# Patient Record
Sex: MALE | Race: WHITE | NOT HISPANIC OR LATINO | URBAN - METROPOLITAN AREA
[De-identification: names, ages, dates, MRNs, and addresses within clinical notes are randomized per-mention and may not be internally consistent; named-entity substitution may affect disease eponyms.]

---

## 2020-12-31 ENCOUNTER — INPATIENT (INPATIENT)
Facility: HOSPITAL | Age: 65
LOS: 2 days | Discharge: HOME | DRG: 378 | End: 2021-01-03
Attending: INTERNAL MEDICINE | Admitting: INTERNAL MEDICINE
Payer: COMMERCIAL

## 2020-12-31 VITALS
DIASTOLIC BLOOD PRESSURE: 84 MMHG | HEIGHT: 69 IN | WEIGHT: 216.05 LBS | OXYGEN SATURATION: 97 % | TEMPERATURE: 98 F | HEART RATE: 89 BPM | RESPIRATION RATE: 20 BRPM | SYSTOLIC BLOOD PRESSURE: 134 MMHG

## 2020-12-31 DIAGNOSIS — K92.1 MELENA: ICD-10-CM

## 2020-12-31 DIAGNOSIS — D64.9 ANEMIA, UNSPECIFIED: ICD-10-CM

## 2020-12-31 DIAGNOSIS — R91.1 SOLITARY PULMONARY NODULE: ICD-10-CM

## 2020-12-31 DIAGNOSIS — Z71.89 OTHER SPECIFIED COUNSELING: ICD-10-CM

## 2020-12-31 DIAGNOSIS — Z29.9 ENCOUNTER FOR PROPHYLACTIC MEASURES, UNSPECIFIED: ICD-10-CM

## 2020-12-31 DIAGNOSIS — K57.92 DIVERTICULITIS OF INTESTINE, PART UNSPECIFIED, WITHOUT PERFORATION OR ABSCESS WITHOUT BLEEDING: ICD-10-CM

## 2020-12-31 DIAGNOSIS — K62.5 HEMORRHAGE OF ANUS AND RECTUM: ICD-10-CM

## 2020-12-31 DIAGNOSIS — K76.89 OTHER SPECIFIED DISEASES OF LIVER: ICD-10-CM

## 2020-12-31 LAB
ALBUMIN SERPL ELPH-MCNC: 2.9 G/DL — LOW (ref 3.5–5)
ALBUMIN SERPL ELPH-MCNC: 3.3 G/DL — LOW (ref 3.5–5)
ALP SERPL-CCNC: 40 U/L — SIGNIFICANT CHANGE UP (ref 40–120)
ALP SERPL-CCNC: 45 U/L — SIGNIFICANT CHANGE UP (ref 40–120)
ALT FLD-CCNC: 21 U/L DA — SIGNIFICANT CHANGE UP (ref 10–60)
ALT FLD-CCNC: 25 U/L DA — SIGNIFICANT CHANGE UP (ref 10–60)
ANION GAP SERPL CALC-SCNC: 12 MMOL/L — SIGNIFICANT CHANGE UP (ref 5–17)
ANION GAP SERPL CALC-SCNC: 7 MMOL/L — SIGNIFICANT CHANGE UP (ref 5–17)
APTT BLD: 32.1 SEC — SIGNIFICANT CHANGE UP (ref 27.5–35.5)
AST SERPL-CCNC: 10 U/L — SIGNIFICANT CHANGE UP (ref 10–40)
AST SERPL-CCNC: 18 U/L — SIGNIFICANT CHANGE UP (ref 10–40)
BASOPHILS # BLD AUTO: 0.03 K/UL — SIGNIFICANT CHANGE UP (ref 0–0.2)
BASOPHILS # BLD AUTO: 0.03 K/UL — SIGNIFICANT CHANGE UP (ref 0–0.2)
BASOPHILS NFR BLD AUTO: 0.3 % — SIGNIFICANT CHANGE UP (ref 0–2)
BASOPHILS NFR BLD AUTO: 0.4 % — SIGNIFICANT CHANGE UP (ref 0–2)
BILIRUB SERPL-MCNC: 0.4 MG/DL — SIGNIFICANT CHANGE UP (ref 0.2–1.2)
BILIRUB SERPL-MCNC: 0.6 MG/DL — SIGNIFICANT CHANGE UP (ref 0.2–1.2)
BUN SERPL-MCNC: 15 MG/DL — SIGNIFICANT CHANGE UP (ref 7–18)
BUN SERPL-MCNC: 15 MG/DL — SIGNIFICANT CHANGE UP (ref 7–18)
CALCIUM SERPL-MCNC: 7.9 MG/DL — LOW (ref 8.4–10.5)
CALCIUM SERPL-MCNC: 8.6 MG/DL — SIGNIFICANT CHANGE UP (ref 8.4–10.5)
CHLORIDE SERPL-SCNC: 109 MMOL/L — HIGH (ref 96–108)
CHLORIDE SERPL-SCNC: 111 MMOL/L — HIGH (ref 96–108)
CHOLEST SERPL-MCNC: 120 MG/DL — SIGNIFICANT CHANGE UP
CO2 SERPL-SCNC: 20 MMOL/L — LOW (ref 22–31)
CO2 SERPL-SCNC: 24 MMOL/L — SIGNIFICANT CHANGE UP (ref 22–31)
CREAT SERPL-MCNC: 0.96 MG/DL — SIGNIFICANT CHANGE UP (ref 0.5–1.3)
CREAT SERPL-MCNC: 1.11 MG/DL — SIGNIFICANT CHANGE UP (ref 0.5–1.3)
EOSINOPHIL # BLD AUTO: 0.03 K/UL — SIGNIFICANT CHANGE UP (ref 0–0.5)
EOSINOPHIL # BLD AUTO: 0.06 K/UL — SIGNIFICANT CHANGE UP (ref 0–0.5)
EOSINOPHIL NFR BLD AUTO: 0.4 % — SIGNIFICANT CHANGE UP (ref 0–6)
EOSINOPHIL NFR BLD AUTO: 0.6 % — SIGNIFICANT CHANGE UP (ref 0–6)
ERYTHROCYTE [SEDIMENTATION RATE] IN BLOOD: 10 MM/HR — SIGNIFICANT CHANGE UP (ref 0–20)
GLUCOSE SERPL-MCNC: 116 MG/DL — HIGH (ref 70–99)
GLUCOSE SERPL-MCNC: 123 MG/DL — HIGH (ref 70–99)
HCT VFR BLD CALC: 28 % — LOW (ref 39–50)
HCT VFR BLD CALC: 31.2 % — LOW (ref 39–50)
HCT VFR BLD CALC: 31.8 % — LOW (ref 39–50)
HCT VFR BLD CALC: 36 % — LOW (ref 39–50)
HDLC SERPL-MCNC: 47 MG/DL — SIGNIFICANT CHANGE UP
HGB BLD-MCNC: 10.5 G/DL — LOW (ref 13–17)
HGB BLD-MCNC: 10.6 G/DL — LOW (ref 13–17)
HGB BLD-MCNC: 12.1 G/DL — LOW (ref 13–17)
HGB BLD-MCNC: 9.5 G/DL — LOW (ref 13–17)
IMM GRANULOCYTES NFR BLD AUTO: 0.5 % — SIGNIFICANT CHANGE UP (ref 0–1.5)
IMM GRANULOCYTES NFR BLD AUTO: 0.8 % — SIGNIFICANT CHANGE UP (ref 0–1.5)
INR BLD: 1.14 RATIO — SIGNIFICANT CHANGE UP (ref 0.88–1.16)
IRON SATN MFR SERPL: 28 % — SIGNIFICANT CHANGE UP (ref 20–55)
IRON SATN MFR SERPL: 77 UG/DL — SIGNIFICANT CHANGE UP (ref 65–170)
LDH SERPL L TO P-CCNC: 117 U/L — LOW (ref 120–225)
LIPID PNL WITH DIRECT LDL SERPL: 61 MG/DL — SIGNIFICANT CHANGE UP
LYMPHOCYTES # BLD AUTO: 1.18 K/UL — SIGNIFICANT CHANGE UP (ref 1–3.3)
LYMPHOCYTES # BLD AUTO: 1.94 K/UL — SIGNIFICANT CHANGE UP (ref 1–3.3)
LYMPHOCYTES # BLD AUTO: 13.9 % — SIGNIFICANT CHANGE UP (ref 13–44)
LYMPHOCYTES # BLD AUTO: 19.7 % — SIGNIFICANT CHANGE UP (ref 13–44)
MAGNESIUM SERPL-MCNC: 1.9 MG/DL — SIGNIFICANT CHANGE UP (ref 1.6–2.6)
MCHC RBC-ENTMCNC: 30.6 PG — SIGNIFICANT CHANGE UP (ref 27–34)
MCHC RBC-ENTMCNC: 30.7 PG — SIGNIFICANT CHANGE UP (ref 27–34)
MCHC RBC-ENTMCNC: 31 PG — SIGNIFICANT CHANGE UP (ref 27–34)
MCHC RBC-ENTMCNC: 31.5 PG — SIGNIFICANT CHANGE UP (ref 27–34)
MCHC RBC-ENTMCNC: 33 GM/DL — SIGNIFICANT CHANGE UP (ref 32–36)
MCHC RBC-ENTMCNC: 33.6 GM/DL — SIGNIFICANT CHANGE UP (ref 32–36)
MCHC RBC-ENTMCNC: 33.9 GM/DL — SIGNIFICANT CHANGE UP (ref 32–36)
MCHC RBC-ENTMCNC: 34 GM/DL — SIGNIFICANT CHANGE UP (ref 32–36)
MCV RBC AUTO: 91.1 FL — SIGNIFICANT CHANGE UP (ref 80–100)
MCV RBC AUTO: 91.5 FL — SIGNIFICANT CHANGE UP (ref 80–100)
MCV RBC AUTO: 92.6 FL — SIGNIFICANT CHANGE UP (ref 80–100)
MCV RBC AUTO: 93 FL — SIGNIFICANT CHANGE UP (ref 80–100)
MONOCYTES # BLD AUTO: 0.9 K/UL — SIGNIFICANT CHANGE UP (ref 0–0.9)
MONOCYTES # BLD AUTO: 1.18 K/UL — HIGH (ref 0–0.9)
MONOCYTES NFR BLD AUTO: 10.6 % — SIGNIFICANT CHANGE UP (ref 2–14)
MONOCYTES NFR BLD AUTO: 12 % — SIGNIFICANT CHANGE UP (ref 2–14)
NEUTROPHILS # BLD AUTO: 6.29 K/UL — SIGNIFICANT CHANGE UP (ref 1.8–7.4)
NEUTROPHILS # BLD AUTO: 6.59 K/UL — SIGNIFICANT CHANGE UP (ref 1.8–7.4)
NEUTROPHILS NFR BLD AUTO: 66.9 % — SIGNIFICANT CHANGE UP (ref 43–77)
NEUTROPHILS NFR BLD AUTO: 73.9 % — SIGNIFICANT CHANGE UP (ref 43–77)
NON HDL CHOLESTEROL: 73 MG/DL — SIGNIFICANT CHANGE UP
NRBC # BLD: 0 /100 WBCS — SIGNIFICANT CHANGE UP (ref 0–0)
OB PNL STL: POSITIVE
PHOSPHATE SERPL-MCNC: 2.9 MG/DL — SIGNIFICANT CHANGE UP (ref 2.5–4.5)
PLATELET # BLD AUTO: 186 K/UL — SIGNIFICANT CHANGE UP (ref 150–400)
PLATELET # BLD AUTO: 198 K/UL — SIGNIFICANT CHANGE UP (ref 150–400)
PLATELET # BLD AUTO: 205 K/UL — SIGNIFICANT CHANGE UP (ref 150–400)
PLATELET # BLD AUTO: 220 K/UL — SIGNIFICANT CHANGE UP (ref 150–400)
POTASSIUM SERPL-MCNC: 4.1 MMOL/L — SIGNIFICANT CHANGE UP (ref 3.5–5.3)
POTASSIUM SERPL-MCNC: 4.2 MMOL/L — SIGNIFICANT CHANGE UP (ref 3.5–5.3)
POTASSIUM SERPL-SCNC: 4.1 MMOL/L — SIGNIFICANT CHANGE UP (ref 3.5–5.3)
POTASSIUM SERPL-SCNC: 4.2 MMOL/L — SIGNIFICANT CHANGE UP (ref 3.5–5.3)
PROT SERPL-MCNC: 6.1 G/DL — SIGNIFICANT CHANGE UP (ref 6–8.3)
PROT SERPL-MCNC: 6.7 G/DL — SIGNIFICANT CHANGE UP (ref 6–8.3)
PROTHROM AB SERPL-ACNC: 13.5 SEC — SIGNIFICANT CHANGE UP (ref 10.6–13.6)
RBC # BLD: 3.06 M/UL — LOW (ref 4.2–5.8)
RBC # BLD: 3.37 M/UL — LOW (ref 4.2–5.8)
RBC # BLD: 3.37 M/UL — LOW (ref 4.2–5.8)
RBC # BLD: 3.42 M/UL — LOW (ref 4.2–5.8)
RBC # BLD: 3.95 M/UL — LOW (ref 4.2–5.8)
RBC # FLD: 12.7 % — SIGNIFICANT CHANGE UP (ref 10.3–14.5)
RBC # FLD: 12.8 % — SIGNIFICANT CHANGE UP (ref 10.3–14.5)
RBC # FLD: 12.9 % — SIGNIFICANT CHANGE UP (ref 10.3–14.5)
RBC # FLD: 12.9 % — SIGNIFICANT CHANGE UP (ref 10.3–14.5)
RETICS #: 68.1 K/UL — SIGNIFICANT CHANGE UP (ref 25–125)
RETICS/RBC NFR: 2 % — SIGNIFICANT CHANGE UP (ref 0.5–2.5)
SARS-COV-2 RNA SPEC QL NAA+PROBE: SIGNIFICANT CHANGE UP
SODIUM SERPL-SCNC: 140 MMOL/L — SIGNIFICANT CHANGE UP (ref 135–145)
SODIUM SERPL-SCNC: 143 MMOL/L — SIGNIFICANT CHANGE UP (ref 135–145)
TIBC SERPL-MCNC: 270 UG/DL — SIGNIFICANT CHANGE UP (ref 250–450)
TRIGL SERPL-MCNC: 59 MG/DL — SIGNIFICANT CHANGE UP
TSH SERPL-MCNC: 1.37 UU/ML — SIGNIFICANT CHANGE UP (ref 0.34–4.82)
UIBC SERPL-MCNC: 193 UG/DL — SIGNIFICANT CHANGE UP (ref 110–370)
WBC # BLD: 8.24 K/UL — SIGNIFICANT CHANGE UP (ref 3.8–10.5)
WBC # BLD: 8.5 K/UL — SIGNIFICANT CHANGE UP (ref 3.8–10.5)
WBC # BLD: 8.74 K/UL — SIGNIFICANT CHANGE UP (ref 3.8–10.5)
WBC # BLD: 9.85 K/UL — SIGNIFICANT CHANGE UP (ref 3.8–10.5)
WBC # FLD AUTO: 8.24 K/UL — SIGNIFICANT CHANGE UP (ref 3.8–10.5)
WBC # FLD AUTO: 8.5 K/UL — SIGNIFICANT CHANGE UP (ref 3.8–10.5)
WBC # FLD AUTO: 8.74 K/UL — SIGNIFICANT CHANGE UP (ref 3.8–10.5)
WBC # FLD AUTO: 9.85 K/UL — SIGNIFICANT CHANGE UP (ref 3.8–10.5)

## 2020-12-31 PROCEDURE — 99223 1ST HOSP IP/OBS HIGH 75: CPT | Mod: GC

## 2020-12-31 PROCEDURE — 93010 ELECTROCARDIOGRAM REPORT: CPT

## 2020-12-31 PROCEDURE — 99284 EMERGENCY DEPT VISIT MOD MDM: CPT

## 2020-12-31 PROCEDURE — 74177 CT ABD & PELVIS W/CONTRAST: CPT | Mod: 26

## 2020-12-31 RX ORDER — METRONIDAZOLE 500 MG
500 TABLET ORAL ONCE
Refills: 0 | Status: COMPLETED | OUTPATIENT
Start: 2020-12-31 | End: 2020-12-31

## 2020-12-31 RX ORDER — PANTOPRAZOLE SODIUM 20 MG/1
40 TABLET, DELAYED RELEASE ORAL
Refills: 0 | Status: DISCONTINUED | OUTPATIENT
Start: 2020-12-31 | End: 2021-01-03

## 2020-12-31 RX ORDER — SODIUM CHLORIDE 9 MG/ML
1000 INJECTION INTRAMUSCULAR; INTRAVENOUS; SUBCUTANEOUS
Refills: 0 | Status: DISCONTINUED | OUTPATIENT
Start: 2020-12-31 | End: 2021-01-03

## 2020-12-31 RX ORDER — CIPROFLOXACIN LACTATE 400MG/40ML
400 VIAL (ML) INTRAVENOUS ONCE
Refills: 0 | Status: COMPLETED | OUTPATIENT
Start: 2020-12-31 | End: 2020-12-31

## 2020-12-31 RX ORDER — SODIUM CHLORIDE 9 MG/ML
1000 INJECTION INTRAMUSCULAR; INTRAVENOUS; SUBCUTANEOUS ONCE
Refills: 0 | Status: COMPLETED | OUTPATIENT
Start: 2020-12-31 | End: 2020-12-31

## 2020-12-31 RX ORDER — HYDROCORTISONE 1 %
1 OINTMENT (GRAM) TOPICAL
Refills: 0 | Status: DISCONTINUED | OUTPATIENT
Start: 2020-12-31 | End: 2021-01-03

## 2020-12-31 RX ORDER — METRONIDAZOLE 500 MG
500 TABLET ORAL EVERY 8 HOURS
Refills: 0 | Status: DISCONTINUED | OUTPATIENT
Start: 2020-12-31 | End: 2021-01-03

## 2020-12-31 RX ORDER — CIPROFLOXACIN LACTATE 400MG/40ML
400 VIAL (ML) INTRAVENOUS EVERY 12 HOURS
Refills: 0 | Status: DISCONTINUED | OUTPATIENT
Start: 2020-12-31 | End: 2021-01-03

## 2020-12-31 RX ADMIN — Medication 200 MILLIGRAM(S): at 12:11

## 2020-12-31 RX ADMIN — SODIUM CHLORIDE 1000 MILLILITER(S): 9 INJECTION INTRAMUSCULAR; INTRAVENOUS; SUBCUTANEOUS at 08:09

## 2020-12-31 RX ADMIN — Medication 100 MILLIGRAM(S): at 21:09

## 2020-12-31 RX ADMIN — SODIUM CHLORIDE 70 MILLILITER(S): 9 INJECTION INTRAMUSCULAR; INTRAVENOUS; SUBCUTANEOUS at 16:46

## 2020-12-31 RX ADMIN — Medication 100 MILLIGRAM(S): at 11:52

## 2020-12-31 RX ADMIN — Medication 1 SUPPOSITORY(S): at 18:51

## 2020-12-31 RX ADMIN — SODIUM CHLORIDE 70 MILLILITER(S): 9 INJECTION INTRAMUSCULAR; INTRAVENOUS; SUBCUTANEOUS at 21:10

## 2020-12-31 RX ADMIN — PANTOPRAZOLE SODIUM 40 MILLIGRAM(S): 20 TABLET, DELAYED RELEASE ORAL at 17:07

## 2020-12-31 NOTE — H&P ADULT - PROBLEM SELECTOR PLAN 4
Pt has incidental finding on ct abd as below  Small focal area of tree-in-bud opacities in the RIGHT lower lobe.  Will ast pt to Consider follow-up in 6 weeks.

## 2020-12-31 NOTE — H&P ADULT - NSHPSOCIALHISTORY_GEN_ALL_CORE
Pt used to smoke 4-5 cigarets every day since teenage and quit 28 yr ago but 4 yr ago startd vaping , Pt vaps every hour nicotine product  Pt reports of drinking 1-2 drinks a week and last drink was on 12/25  pt denies any drug use.

## 2020-12-31 NOTE — CONSULT NOTE ADULT - PROBLEM SELECTOR PROBLEM 3
Sore Throat in Children: Care Instructions  Your Care Instructions  Infection by bacteria or a virus causes most sore throats. Cigarette smoke, dry air, air pollution, allergies, or yelling also can cause a sore throat. Sore throats can be painful and annoying. Fortunately, most sore throats go away on their own. Home treatment may help your child feel better sooner. Antibiotics are not needed unless your child has a strep infection. Follow-up care is a key part of your child's treatment and safety. Be sure to make and go to all appointments, and call your doctor if your child is having problems. It's also a good idea to know your child's test results and keep a list of the medicines your child takes. How can you care for your child at home? · If the doctor prescribed antibiotics for your child, give them as directed. Do not stop using them just because your child feels better. Your child needs to take the full course of antibiotics. · If your child is old enough to do so, have him or her gargle with warm salt water at least once each hour to help reduce swelling and relieve discomfort. Use 1 teaspoon of salt mixed in 8 ounces of warm water. Most children can gargle when they are 10to 6years old. · Give acetaminophen (Tylenol) or ibuprofen (Advil, Motrin) for pain. Read and follow all instructions on the label. Do not give aspirin to anyone younger than 20. It has been linked to Reye syndrome, a serious illness. · Try an over-the-counter anesthetic throat spray or throat lozenges, which may help relieve throat pain. Do not give lozenges to children younger than age 3. If your child is younger than age 3, ask your doctor if you can give your child numbing medicines. · Have your child drink plenty of fluids, enough so that his or her urine is light yellow or clear like water. Drinks such as warm water or warm lemonade may ease throat pain.  Frozen ice treats, ice cream, scrambled eggs, gelatin dessert, and sherbet can also soothe the throat. If your child has kidney, heart, or liver disease and has to limit fluids, talk with your doctor before you increase the amount of fluids your child drinks. · Keep your child away from smoke. Do not smoke or let anyone else smoke around your child or in your house. Smoke irritates the throat. · Place a humidifier by your child's bed or close to your child. This may make it easier for your child to breathe. Follow the directions for cleaning the machine. When should you call for help? Call 911 anytime you think your child may need emergency care. For example, call if:  ? · Your child is confused, does not know where he or she is, or is extremely sleepy or hard to wake up. ?Call your doctor now or seek immediate medical care if:  ? · Your child has a new or higher fever. ? · Your child has a fever with a stiff neck or a severe headache. ? · Your child has any trouble breathing. ? · Your child cannot swallow or cannot drink enough because of throat pain. ? · Your child coughs up discolored or bloody mucus. ? Watch closely for changes in your child's health, and be sure to contact your doctor if:  ? · Your child has any new symptoms, such as a rash, an earache, vomiting, or nausea. ? · Your child is not getting better as expected. Where can you learn more? Go to http://heather-denny.info/. Enter B491 in the search box to learn more about \"Sore Throat in Children: Care Instructions. \"  Current as of: May 12, 2017  Content Version: 11.4  © 6546-1399 [employer]. Care instructions adapted under license by [employer] (which disclaims liability or warranty for this information). If you have questions about a medical condition or this instruction, always ask your healthcare professional. [address] any warranty or liability for your use of this information. ACP (advance care planning)

## 2020-12-31 NOTE — H&P ADULT - PROBLEM SELECTOR PLAN 5
Pt has incidental finding if liver cyst as below on ct abd    Small subcentimeter cysts in the periphery of the LEFT lobe of the liver. Additional smaller cysts are seen  will recommend f/u as out pt

## 2020-12-31 NOTE — CONSULT NOTE ADULT - PROBLEM SELECTOR RECOMMENDATION 9
Short round of anbx (7-10 days)  Can advance to clears and then low fiber while on anbx   Avoid NSAIDs   Stool softener  Repeat colonoscopy as an outpatient on an elective basis unless persistent bleeding.

## 2020-12-31 NOTE — H&P ADULT - NSHPPHYSICALEXAM_GEN_ALL_CORE
PHYSICAL EXAM:  GENERAL: speaks in full sentences, no signs of distress  HEAD:  Atraumatic, Normocephalic  EYES: EOMI, PERRLA, conjunctiva and sclera clear  NECK: Supple, No JVD  CHEST/LUNG: Clear to auscultation bilaterally; No wheeze; No crackles; No accessory muscles used  HEART: s1,s2; No murmurs;   ABDOMEN: Soft, Nontender, +distended; some pressure like feeling by pt in LLQ, Bowel sounds present; No guarding  EXTREMITIES:  2+ Peripheral Pulses, No cyanosis or edema  PSYCH: AAOx3  NEUROLOGY: no-focal DEFICIT  SKIN: No rashes or lesions

## 2020-12-31 NOTE — H&P ADULT - PROBLEM SELECTOR PLAN 6
RISK                                                          Points  [] Previous VTE                                           3  [] Thrombophilia                                        2  [] Lower limb paralysis                              2   [] Current Cancer                                       2   [x] Immobilization > 24 hrs                        1  [] ICU/CCU stay > 24 hours                       1  [x] Age > 60                                                   1    scd boot holding dvt prophylaxis in setting of active bleed

## 2020-12-31 NOTE — ED PROVIDER NOTE - PROGRESS NOTE DETAILS
pt to be admitted for rectal bleeding.  Dr. Garza asked that we admit to hospitalist.  Dr. Brown for GI. pt to be admitted for rectal bleeding.  Dr. Garza asked that we admit to hospitalist.  Dr. Brown for GI made aware.

## 2020-12-31 NOTE — CONSULT NOTE ADULT - SUBJECTIVE AND OBJECTIVE BOX
Patient is a 65y old  Male who presents with a chief complaint of BRBPR (31 Dec 2020 15:11)     .     HPI:    HPI:  Pt is 65 year old male with PMHX of Hemrhoids, divrticulosis ( blood diarrhoea 2016 that resolved on its own) no PSHX came with complaint of BRBPR. pt reports he has been having red color stool since yesterday and had 8-10 episodes yesterday and 2 episodes today. Last episode was around 11am 12/31. Pt reports he has  hemorrhoids but never had this much bleeding. Pt reports bright red blood when there is no stool in BM and blackish BM when there is stool.  Pt endorse pressure like sensation in abd kel left lower side. Pt denies any nausea, vomiting, chest,, pain, shortness of breath, abdominal pain, urinary symptoms.   ED COURSE : s/p cipro and flagyl    [facility] FULL CODE (31 Dec 2020 14:08)          REVIEW OF SYSTEMS  Constitutional:   No fever, no fatigue, no pallor, no night sweats, no weight loss.  HEENT:   No eye pain, no vision changes, no icterus, no mouth ulcers.  Respiratory:   No shortness of breath, no cough, no respiratory distress.   Cardiovascular:   No chest pain, no palpitations.   Gastrointestinal: No abdominal pain, no nausea, no vomiting , no diahrrea, no constipation, no hematochezia,no melena.  Skin:   No rashes, no jaundice, no eczema.   Musculoskeletal:   No joint pain, no swelling, no myalgia.   Neurologic:   No headache, no seizure, no weakness.   Genitourinary:   No dysuria, no decreased urine output.  Psychiatric:  No depression, no anxiety,   Endocrine:   No thyroid disease, no diabetes.  Heme/Lymphatic:   No anemia, no blood transfusions, no lymph node enlargement, no bleeding, no bruising.  ___________________________________________________________________________________________  Allergies    aspirin (Swelling)  Tirimisol---red eyes (Other)    Intolerances      MEDICATIONS  (STANDING):  ciprofloxacin   IVPB 400 milliGRAM(s) IV Intermittent every 12 hours  metroNIDAZOLE  IVPB 500 milliGRAM(s) IV Intermittent every 8 hours  pantoprazole  Injectable 40 milliGRAM(s) IV Push two times a day  sodium chloride 0.9%. 1000 milliLiter(s) (70 mL/Hr) IV Continuous <Continuous>    MEDICATIONS  (PRN):      PAST MEDICAL & SURGICAL HISTORY:  Diverticulosis    No significant past surgical history      FAMILY HISTORY:  No pertinent family history in first degree relatives      Social History: No hsitory of : Tobacco use, IVDA, EToH  ______________________________________________________________________________________    PHYSICAL EXAM    Daily Height in cm: 175.26 (31 Dec 2020 07:26)    Daily   BMI: 31.9 (12-31 @ 07:26)  Change in Weight:  Vital Signs Last 24 Hrs  T(C): 36.7 (31 Dec 2020 15:30), Max: 36.7 (31 Dec 2020 11:16)  T(F): 98.1 (31 Dec 2020 15:30), Max: 98.1 (31 Dec 2020 15:30)  HR: 76 (31 Dec 2020 15:30) (76 - 89)  BP: 108/76 (31 Dec 2020 15:30) (108/76 - 134/84)  BP(mean): 82 (31 Dec 2020 15:30) (82 - 82)  RR: 18 (31 Dec 2020 15:30) (18 - 20)  SpO2: 99% (31 Dec 2020 15:30) (97% - 99%)    General:  Well developed, well nourished, alert and active, no pallor, NAD.  HEENT:    Normal appearance of conjunctiva, ears, nose, lips, oropharynx, and oral mucosa, anicteric.  Neck:  No masses, no asymmetry.  Lymph Nodes:  No lymphadenopathy.   Cardiovascular:  RRR normal S1/S2, no murmur.  Respiratory:  CTA B/L, normal respiratory effort.   Abdominal:   soft, no masses or tenderness, normoactive BS, NT/ND, no HSM.  Extremities:   No clubbing or cyanosis, normal capillary refill, no edema.   Skin:   No rash, jaundice, lesions, eczema.   Musculoskeletal:  No joint swelling, erythema or tenderness.   Neuro: No focal deficits.   Other:   _______________________________________________________________________________________________  Lab Results:                          10.5   8.74  )-----------( 198      ( 31 Dec 2020 16:47 )             31.8     12-31    143  |  111<H>  |  15  ----------------------------<  123<H>  4.1   |  20<L>  |  1.11    Ca    8.6      31 Dec 2020 08:08    TPro  6.7  /  Alb  3.3<L>  /  TBili  0.6  /  DBili  x   /  AST  18  /  ALT  25  /  AlkPhos  45  12-31    LIVER FUNCTIONS - ( 31 Dec 2020 08:08 )  Alb: 3.3 g/dL / Pro: 6.7 g/dL / ALK PHOS: 45 U/L / ALT: 25 U/L DA / AST: 18 U/L / GGT: x           PT/INR - ( 31 Dec 2020 08:08 )   PT: 13.5 sec;   INR: 1.14 ratio         PTT - ( 31 Dec 2020 08:08 )  PTT:32.1 sec        Stool Results:          RADIOLOGY RESULTS:  < from: CT Abdomen and Pelvis w/ IV Cont (12.31.20 @ 11:12) >    EXAM:  CT ABDOMEN AND PELVIS IC                            PROCEDURE DATE:  12/31/2020          INTERPRETATION:  CLINICAL INFORMATION: LEFT lower quadrant abdominal pain    COMPARISON: None.    PROCEDURE:  CT of the Abdomen and Pelvis was performedwith intravenous contrast.  Intravenous contrast: 90 ml Omnipaque 350. 10 ml discarded.  Oral contrast: None.  Sagittal and coronal reformats were performed.    FINDINGS:  LOWER CHEST: Small focal area of tree-in-bud opacities within the RIGHT lower lobe (2-10 through 2-14).    LIVER: Small subcentimeter cysts in the periphery of the LEFT lobe of the liver. Additional smaller cysts are seen.  BILE DUCTS: Normal caliber.  GALLBLADDER: Within normal limits.  SPLEEN: Within normal limits.  PANCREAS:Within normal limits.  ADRENALS: Within normal limits.  KIDNEYS/URETERS: Within normal limits.    BLADDER: Within normal limits.  REPRODUCTIVE ORGANS: Prostatic calcifications.    BOWEL: There is minimal fat stranding adjacent to a diverticula withinthe sigmoid colon likely representing early mild uncomplicated sigmoid diverticulitis. Appendix is normal.  PERITONEUM: No ascites.  VESSELS: Within normal limits.  RETROPERITONEUM/LYMPH NODES: LEFT para-aortic lymph node which is rounded and slightly hyperenhancing measuring 1.1 x 1.0 cm.  ABDOMINAL WALL: Within normal limits.  BONES: L3 hemangioma.    IMPRESSION:  Early diverticulitis involving the sigmoid colon.  Small focal area of tree-in-bud opacities in the RIGHT lower lobe. Consider follow-up in 6 weeks.              SVEN CHAVIS MD; Attending Radiologist  This document has been electronically signed. Dec 31 2020 11:35AM    < end of copied text >    SURGICAL PATHOLOGY:     < from: Colonoscopy (06.14.16 @ 15:29) >    [facility]  _______________________________________________________________________________  Patient Name: Slim Brady         Procedure Date: 6/14/2016 3:29 PM  MRN: [id number]                     Account Number: [id number]  YOB: 1955               Admit Type: Inpatient  Room: [place] [address]                         Gender: Male  Attending MD: ELISSA VALADEZ MD       _______________________________________________________________________________     Procedure:           Colonoscopy  Indications:         60 year old man with hematochezia and anemia  Providers:           ELISSA VALADEZ MD, AMELIA MAHER MD (Fellow)  Referring MD:        MINOR DE OLIVEIRA MD  Medicines:           Monitored Anesthesia Care  Complications:       No immediate complications.  Procedure:           - The risks (infection, bleeding, perforation,                        anesthesia related complication, pancreatitis if                        appropriate, missed lesions etc), benefits, alternatives                        explained to the patient and family. The patient agreed                        to the procedure.                       Pre-Anesthesia Assessment:                       - Prior to the procedure, a History andPhysical was                        performed, and patient medications, allergies and                        sensitivities were reviewed. The patient's tolerance of                        previous anesthesia was reviewed.                       - The risks and benefits of the procedure and the                        sedation options and risks were discussed with the                        patient. All questions were answered and informed                        consent was obtained.        - Immediately prior to administration of medications,                        the patient was re-assessed for adequacy to receive                        sedatives.                       After I obtained informed consent, the scope was passed                      under direct vision. Throughout the procedure, the                        patient's blood pressure, pulse, and oxygen saturations                        were monitored continuously. The Colonoscope was                        introduced through the anus and advanced to the terminal                        ileum, with identification of the appendiceal orifice                        and IC valve. The colonoscopy was performed without                        difficulty. The patient tolerated the procedure well.                        The quality of the bowel preparation was good.                                                                                   Findings:       The perianal and digital rectal examinations were normal.       Scattered small and large-mouthed diverticula were found in the sigmoid        colon, in the descending colon and in the transverse colon. There was no        evidence of diverticular bleeding.       Non-bleeding internal hemorrhoids were found during retroflexion. The        hemorrhoids were medium-sized.       The examined portion of the terminal ileum appeared normal.       The exam was otherwise normal throughout the examined colon.                                Impression:          - Moderate diverticulosis in the sigmoid colon, in the                        descending colon and in the transverse colon. There was                        no evidence of diverticular bleeding.                   - Non-bleeding internal hemorrhoids.                       - The examined portion of the terminal ileum was normal.  Recommendation:      - [facility].                       - Advance diet as tolerated.                       - Repeat colonoscopy in 5-10 years for screening                        purposes.                                                                                   Attending Participation:       I was present and participated during the entire procedure, including        non-key portions.                                                                                     ___________________  ELISSA VALADEZ MD  6/14/2016 6:39:45 PM  This report has been signed electronically.  Number of Addenda: 0    Note Initiated On: 6/14/2016 3:29 PM    < end of copied text >

## 2020-12-31 NOTE — H&P ADULT - ASSESSMENT
Pt is 65 year old male with PMHX of Hemrhoids, divrticulosis ( blood diarrhoea 2016 that resolved on its own) no PSHX came with complaint of BRBPR. pt reports he has been having red color stool since yesterday and had 8-10 episodes yesterday and 2 episodes today. Last episode was around 11am 12/31. On ct scan pt found to have diverticulitis in sigmoid colon. Admitted for GI workup.

## 2020-12-31 NOTE — CONSULT NOTE ADULT - PROBLEM SELECTOR RECOMMENDATION 2
Likely a diverticular bleeding however in the setting of constipation cover for internal hemorrhoids with 10 days of Anusol.

## 2020-12-31 NOTE — H&P ADULT - ATTENDING COMMENTS
Patient seen/evaluated at bedside on 12/31/2020. I agree with the resident H&P/outlined plan of care on . My independent findings and conclusions are documented.    T(C): 36.7 (31 Dec 2020 15:30), Max: 36.7 (31 Dec 2020 11:16)  T(F): 98.1 (31 Dec 2020 15:30), Max: 98.1 (31 Dec 2020 15:30)  HR: 76 (31 Dec 2020 15:30) (76 - 89)  BP: 108/76 (31 Dec 2020 15:30) (108/76 - 134/84)  BP(mean): 82 (31 Dec 2020 15:30) (82 - 82)  RR: 18 (31 Dec 2020 15:30) (18 - 20)  SpO2: 99% (31 Dec 2020 15:30) (97% - 99%)                          10.5   8.74  )-----------( 198      ( 31 Dec 2020 16:47 )             31.8     12-31    143  |  111<H>  |  15  ----------------------------<  123<H>  4.1   |  20<L>  |  1.11    Ca    8.6      31 Dec 2020 08:08    TPro  6.7  /  Alb  3.3<L>  /  TBili  0.6  /  DBili  x   /  AST  18  /  ALT  25  /  AlkPhos  45  12-31    1. acute sigmoid diverticulitis  2. diverticular bleed  3. acute blood loss anemia  4. constipation Patient seen/evaluated at bedside on 12/31/2020. I agree with the resident H&P/outlined plan of care on . My independent findings and conclusions are documented.    T(C): 36.7 (31 Dec 2020 15:30), Max: 36.7 (31 Dec 2020 11:16)  T(F): 98.1 (31 Dec 2020 15:30), Max: 98.1 (31 Dec 2020 15:30)  HR: 76 (31 Dec 2020 15:30) (76 - 89)  BP: 108/76 (31 Dec 2020 15:30) (108/76 - 134/84)  BP(mean): 82 (31 Dec 2020 15:30) (82 - 82)  RR: 18 (31 Dec 2020 15:30) (18 - 20)  SpO2: 99% (31 Dec 2020 15:30) (97% - 99%)                          10.5   8.74  )-----------( 198      ( 31 Dec 2020 16:47 )             31.8     12-31    143  |  111<H>  |  15  ----------------------------<  123<H>  4.1   |  20<L>  |  1.11    Ca    8.6      31 Dec 2020 08:08    TPro  6.7  /  Alb  3.3<L>  /  TBili  0.6  /  DBili  x   /  AST  18  /  ALT  25  /  AlkPhos  45  12-31    CT abd/pelvis: Early diverticulitis involving the sigmoid colon.  Small focal area of tree-in-bud opacities in the RIGHT lower lobe. Consider follow-up in 6 weeks    1. acute sigmoid diverticulitis  2. diverticular bleed  3. acute blood loss anemia  4. constipation  5. right lung abnormality on CT chest    serial cbc--> q 6 hours until stable then q 12. Maintain large bore Ivs in place  transfuse for hgb <8-7 or ongoing active bleeding  ciprofloxacin flagyl  seen by gI who advised clear liquid diet, no plan for colonoscopy at time  does not have a lung nodule per se but tree in bud opacity--> needs repeat imaging in 6 weeks--> has no current pulmonary complaints. Of note this patient does vape

## 2020-12-31 NOTE — H&P ADULT - HISTORY OF PRESENT ILLNESS
Pt is 65 year old male with PMHX of Hemrhoids, divrticulosis ( blood diarrhoea 2016 that resolved on its own) no PSHX came with complaint of BRBPR. pt reports he has been having red color stool since yesterday and had 8-10 episodes yesterday and 2 episodes today. Last episode was around 11am 12/31. Pt reports he has  hemorrhoids but never had this much bleeding. Pt reports bright red blood when there is no stool in BM and blackish BM when there is stool.  Pt endorse pressure like sensation in abd kel left lower side. Pt denies any nausea, vomiting, chest,, pain, shortness of breath, abdominal pain, urinary symptoms.   ED COURSE : s/p cipro and flagyl    [facility] FULL CODE

## 2020-12-31 NOTE — ED PROVIDER NOTE - OBJECTIVE STATEMENT
66 y/o male with PMHx of diverticulosis and hemorrhoids presents with rectal bleeding, since Tuesday.  pt says bright red blood with and without bowel movements.  Pt denies nausea or vomiting, denies use of blood thinners.  Pt says he has something similar for which he was admitted and transfused approx 4 years ago.  PT is reporting feeling weak and with LLQ pain assoicated with rectal bleeding.

## 2020-12-31 NOTE — ED PROVIDER NOTE - CLINICAL SUMMARY MEDICAL DECISION MAKING FREE TEXT BOX
Pt presents with LLQ pain and rectal bleeding.  Gross blood on exam.  Will check labs, CT r/o diverticulitius, reassess for admission.

## 2020-12-31 NOTE — CONSULT NOTE ADULT - ASSESSMENT
65 year old male with rectal bleeding and  early diverticulitis. Reports mild constipation prior. Reports NSAID use.

## 2020-12-31 NOTE — ED ADULT NURSE NOTE - OBJECTIVE STATEMENT
pt presents for rectal bleeding. Denies any other acute complaints. pt presents for rectal bleeding. Endorses weakness.  Denies any other acute complaints.

## 2020-12-31 NOTE — H&P ADULT - PROBLEM SELECTOR PLAN 2
Pt p/w BRBPR , 8-10 BM YESTERDAY AND twice today  on exam no tenderness  ct abd showed Early diverticulitis involving the sigmoid colon.  will start antibiotics cipro and flagyl  NPO for now  gi Dr. Brown Pt p/w BRBPR , 8-10 BM YESTERDAY AND twice today  on exam no tenderness  ct abd showed Early diverticulitis involving the sigmoid colon.  will start antibiotics cipro and flagyl  clear liquid diet for now  gi Dr. Brown

## 2021-01-01 LAB
A1C WITH ESTIMATED AVERAGE GLUCOSE RESULT: 5.5 % — SIGNIFICANT CHANGE UP (ref 4–5.6)
ALBUMIN SERPL ELPH-MCNC: 2.6 G/DL — LOW (ref 3.5–5)
ALP SERPL-CCNC: 35 U/L — LOW (ref 40–120)
ALT FLD-CCNC: 18 U/L DA — SIGNIFICANT CHANGE UP (ref 10–60)
ANION GAP SERPL CALC-SCNC: 5 MMOL/L — SIGNIFICANT CHANGE UP (ref 5–17)
AST SERPL-CCNC: 10 U/L — SIGNIFICANT CHANGE UP (ref 10–40)
BASOPHILS # BLD AUTO: 0.03 K/UL — SIGNIFICANT CHANGE UP (ref 0–0.2)
BASOPHILS NFR BLD AUTO: 0.4 % — SIGNIFICANT CHANGE UP (ref 0–2)
BILIRUB SERPL-MCNC: 0.3 MG/DL — SIGNIFICANT CHANGE UP (ref 0.2–1.2)
BUN SERPL-MCNC: 12 MG/DL — SIGNIFICANT CHANGE UP (ref 7–18)
CALCIUM SERPL-MCNC: 7.8 MG/DL — LOW (ref 8.4–10.5)
CHLORIDE SERPL-SCNC: 110 MMOL/L — HIGH (ref 96–108)
CO2 SERPL-SCNC: 26 MMOL/L — SIGNIFICANT CHANGE UP (ref 22–31)
CREAT SERPL-MCNC: 0.93 MG/DL — SIGNIFICANT CHANGE UP (ref 0.5–1.3)
EOSINOPHIL # BLD AUTO: 0.08 K/UL — SIGNIFICANT CHANGE UP (ref 0–0.5)
EOSINOPHIL NFR BLD AUTO: 1.2 % — SIGNIFICANT CHANGE UP (ref 0–6)
ESTIMATED AVERAGE GLUCOSE: 111 MG/DL — SIGNIFICANT CHANGE UP (ref 68–114)
FERRITIN SERPL-MCNC: 82 NG/ML — SIGNIFICANT CHANGE UP (ref 30–400)
FOLATE SERPL-MCNC: >20 NG/ML — SIGNIFICANT CHANGE UP
GLUCOSE SERPL-MCNC: 103 MG/DL — HIGH (ref 70–99)
HCT VFR BLD CALC: 26.6 % — LOW (ref 39–50)
HCT VFR BLD CALC: 28.7 % — LOW (ref 39–50)
HCV AB S/CO SERPL IA: 0.06 S/CO — SIGNIFICANT CHANGE UP (ref 0–0.99)
HCV AB SERPL-IMP: SIGNIFICANT CHANGE UP
HGB BLD-MCNC: 9.1 G/DL — LOW (ref 13–17)
HGB BLD-MCNC: 9.5 G/DL — LOW (ref 13–17)
IMM GRANULOCYTES NFR BLD AUTO: 0.6 % — SIGNIFICANT CHANGE UP (ref 0–1.5)
LYMPHOCYTES # BLD AUTO: 1.52 K/UL — SIGNIFICANT CHANGE UP (ref 1–3.3)
LYMPHOCYTES # BLD AUTO: 22.6 % — SIGNIFICANT CHANGE UP (ref 13–44)
MCHC RBC-ENTMCNC: 30.5 PG — SIGNIFICANT CHANGE UP (ref 27–34)
MCHC RBC-ENTMCNC: 31.2 PG — SIGNIFICANT CHANGE UP (ref 27–34)
MCHC RBC-ENTMCNC: 33.1 GM/DL — SIGNIFICANT CHANGE UP (ref 32–36)
MCHC RBC-ENTMCNC: 34.2 GM/DL — SIGNIFICANT CHANGE UP (ref 32–36)
MCV RBC AUTO: 91.1 FL — SIGNIFICANT CHANGE UP (ref 80–100)
MCV RBC AUTO: 92.3 FL — SIGNIFICANT CHANGE UP (ref 80–100)
MONOCYTES # BLD AUTO: 1.07 K/UL — HIGH (ref 0–0.9)
MONOCYTES NFR BLD AUTO: 15.9 % — HIGH (ref 2–14)
NEUTROPHILS # BLD AUTO: 3.98 K/UL — SIGNIFICANT CHANGE UP (ref 1.8–7.4)
NEUTROPHILS NFR BLD AUTO: 59.3 % — SIGNIFICANT CHANGE UP (ref 43–77)
NRBC # BLD: 0 /100 WBCS — SIGNIFICANT CHANGE UP (ref 0–0)
NRBC # BLD: 0 /100 WBCS — SIGNIFICANT CHANGE UP (ref 0–0)
PLATELET # BLD AUTO: 178 K/UL — SIGNIFICANT CHANGE UP (ref 150–400)
PLATELET # BLD AUTO: 205 K/UL — SIGNIFICANT CHANGE UP (ref 150–400)
POTASSIUM SERPL-MCNC: 4.2 MMOL/L — SIGNIFICANT CHANGE UP (ref 3.5–5.3)
POTASSIUM SERPL-SCNC: 4.2 MMOL/L — SIGNIFICANT CHANGE UP (ref 3.5–5.3)
PROT SERPL-MCNC: 5.5 G/DL — LOW (ref 6–8.3)
RBC # BLD: 2.92 M/UL — LOW (ref 4.2–5.8)
RBC # BLD: 3.11 M/UL — LOW (ref 4.2–5.8)
RBC # FLD: 12.7 % — SIGNIFICANT CHANGE UP (ref 10.3–14.5)
RBC # FLD: 13 % — SIGNIFICANT CHANGE UP (ref 10.3–14.5)
SODIUM SERPL-SCNC: 141 MMOL/L — SIGNIFICANT CHANGE UP (ref 135–145)
TRANSFERRIN SERPL-MCNC: 207 MG/DL — SIGNIFICANT CHANGE UP (ref 200–360)
VIT B12 SERPL-MCNC: 907 PG/ML — SIGNIFICANT CHANGE UP (ref 232–1245)
WBC # BLD: 6.72 K/UL — SIGNIFICANT CHANGE UP (ref 3.8–10.5)
WBC # BLD: 8.9 K/UL — SIGNIFICANT CHANGE UP (ref 3.8–10.5)
WBC # FLD AUTO: 6.72 K/UL — SIGNIFICANT CHANGE UP (ref 3.8–10.5)
WBC # FLD AUTO: 8.9 K/UL — SIGNIFICANT CHANGE UP (ref 3.8–10.5)

## 2021-01-01 PROCEDURE — 99233 SBSQ HOSP IP/OBS HIGH 50: CPT

## 2021-01-01 RX ADMIN — Medication 100 MILLIGRAM(S): at 05:51

## 2021-01-01 RX ADMIN — Medication 1 SUPPOSITORY(S): at 17:38

## 2021-01-01 RX ADMIN — Medication 100 MILLIGRAM(S): at 13:01

## 2021-01-01 RX ADMIN — Medication 200 MILLIGRAM(S): at 11:58

## 2021-01-01 RX ADMIN — PANTOPRAZOLE SODIUM 40 MILLIGRAM(S): 20 TABLET, DELAYED RELEASE ORAL at 17:38

## 2021-01-01 RX ADMIN — Medication 200 MILLIGRAM(S): at 01:45

## 2021-01-01 RX ADMIN — PANTOPRAZOLE SODIUM 40 MILLIGRAM(S): 20 TABLET, DELAYED RELEASE ORAL at 05:52

## 2021-01-01 RX ADMIN — Medication 200 MILLIGRAM(S): at 23:13

## 2021-01-01 RX ADMIN — Medication 1 SUPPOSITORY(S): at 05:52

## 2021-01-01 RX ADMIN — Medication 100 MILLIGRAM(S): at 21:10

## 2021-01-01 NOTE — CHART NOTE - NSCHARTNOTEFT_GEN_A_CORE
Event : Notified by RN of bloody BM's    HPI       65 year old male with PMHX of Hemrhoids, diverticulosis ( blood diarrhoea 2016 that resolved on its own) no PSHX came with complaint of BRBPR. CT abd showed Early diverticulitis involving the sigmoid colon for which  Pt was started on IV Cipro/Flagyl. Now with  BRBPR. Pt reports blood stools are actually lessening, had 3 episodes today; less abdominal discomfort today, tolerating clear liquids. Pt denies dizziness, SOB, chest discomfort, N/V.     Vital Signs Last 24 Hrs  T(C): 37.1 (01 Jan 2021 14:31), Max: 37.3 (31 Dec 2020 21:45)  T(F): 98.7 (01 Jan 2021 14:31), Max: 99.2 (31 Dec 2020 21:45)  HR: 64 (01 Jan 2021 14:31) (64 - 78)  BP: 126/70 (01 Jan 2021 14:31) (104/66 - 145/80)  BP(mean): --  RR: 18 (01 Jan 2021 14:31) (18 - 18)  SpO2: 98% (01 Jan 2021 14:31) (98% - 99%)    MEDICATIONS  (STANDING):  ciprofloxacin   IVPB 400 milliGRAM(s) IV Intermittent every 12 hours  hydrocortisone hemorrhoidal Suppository 1 Suppository(s) Rectal two times a day  metroNIDAZOLE  IVPB 500 milliGRAM(s) IV Intermittent every 8 hours  pantoprazole  Injectable 40 milliGRAM(s) IV Push two times a day  sodium chloride 0.9%. 1000 milliLiter(s) (70 mL/Hr) IV Continuous <Continuous>    MEDICATIONS  (PRN):    PE: Pulm: Resp unlabored, CTA bilaterally        CV: S1S2, regular, no murmur        Abd: BS(+), soft, nontender, nondistended       Ext: no edema        Neuro: grossly normal                              9.1    6.72  )-----------( 178      ( 01 Jan 2021 05:37 )             26.6       Assessment/Plan:  1. BRBPR 2/2 Diverticulitis      c/w IV Cipro/Flagyl      F/u CBC and transfuse for Hgb < 7.5    Plan of care d/w pt and ANGEL.    Keke Kim, NP Medicine

## 2021-01-01 NOTE — PROGRESS NOTE ADULT - ASSESSMENT
65 M recurrent hx of sigmoid diverticulitis 2016 no hx of colon cancer.   dark stools   monitor clinically   check hh trend over time, transfuse for hb less than 7.5    continue empiric iv cipro/flagyl   GI team see assist appreciated  vte proph      reviewed with patient IDT plan of care  65 M recurrent hx of sigmoid diverticulitis 2016 no hx of colon cancer.   dark stools BP ok  monitor clinically   check hh trend over time, transfuse for hb less than 7.5    continue empiric iv cipro/flagyl   GI team see assist appreciated, diet per GI team   vte proph      reviewed with patient IDT plan of care

## 2021-01-02 LAB
ANION GAP SERPL CALC-SCNC: 7 MMOL/L — SIGNIFICANT CHANGE UP (ref 5–17)
BUN SERPL-MCNC: 8 MG/DL — SIGNIFICANT CHANGE UP (ref 7–18)
CALCIUM SERPL-MCNC: 8.2 MG/DL — LOW (ref 8.4–10.5)
CHLORIDE SERPL-SCNC: 109 MMOL/L — HIGH (ref 96–108)
CO2 SERPL-SCNC: 26 MMOL/L — SIGNIFICANT CHANGE UP (ref 22–31)
CREAT SERPL-MCNC: 0.94 MG/DL — SIGNIFICANT CHANGE UP (ref 0.5–1.3)
CULTURE RESULTS: SIGNIFICANT CHANGE UP
CULTURE RESULTS: SIGNIFICANT CHANGE UP
GLUCOSE SERPL-MCNC: 100 MG/DL — HIGH (ref 70–99)
HCT VFR BLD CALC: 26.1 % — LOW (ref 39–50)
HGB BLD-MCNC: 8.7 G/DL — LOW (ref 13–17)
MAGNESIUM SERPL-MCNC: 2 MG/DL — SIGNIFICANT CHANGE UP (ref 1.6–2.6)
MCHC RBC-ENTMCNC: 30.3 PG — SIGNIFICANT CHANGE UP (ref 27–34)
MCHC RBC-ENTMCNC: 33.3 GM/DL — SIGNIFICANT CHANGE UP (ref 32–36)
MCV RBC AUTO: 90.9 FL — SIGNIFICANT CHANGE UP (ref 80–100)
NRBC # BLD: 0 /100 WBCS — SIGNIFICANT CHANGE UP (ref 0–0)
PLATELET # BLD AUTO: 193 K/UL — SIGNIFICANT CHANGE UP (ref 150–400)
POTASSIUM SERPL-MCNC: 3.9 MMOL/L — SIGNIFICANT CHANGE UP (ref 3.5–5.3)
POTASSIUM SERPL-SCNC: 3.9 MMOL/L — SIGNIFICANT CHANGE UP (ref 3.5–5.3)
RBC # BLD: 2.87 M/UL — LOW (ref 4.2–5.8)
RBC # FLD: 12.8 % — SIGNIFICANT CHANGE UP (ref 10.3–14.5)
SARS-COV-2 IGG SERPL QL IA: NEGATIVE — SIGNIFICANT CHANGE UP
SARS-COV-2 IGM SERPL IA-ACNC: <0.1 INDEX — SIGNIFICANT CHANGE UP
SODIUM SERPL-SCNC: 142 MMOL/L — SIGNIFICANT CHANGE UP (ref 135–145)
SPECIMEN SOURCE: SIGNIFICANT CHANGE UP
SPECIMEN SOURCE: SIGNIFICANT CHANGE UP
WBC # BLD: 6.46 K/UL — SIGNIFICANT CHANGE UP (ref 3.8–10.5)
WBC # FLD AUTO: 6.46 K/UL — SIGNIFICANT CHANGE UP (ref 3.8–10.5)

## 2021-01-02 PROCEDURE — 99231 SBSQ HOSP IP/OBS SF/LOW 25: CPT

## 2021-01-02 RX ADMIN — Medication 1 SUPPOSITORY(S): at 05:11

## 2021-01-02 RX ADMIN — Medication 1 SUPPOSITORY(S): at 17:28

## 2021-01-02 RX ADMIN — Medication 200 MILLIGRAM(S): at 23:19

## 2021-01-02 RX ADMIN — PANTOPRAZOLE SODIUM 40 MILLIGRAM(S): 20 TABLET, DELAYED RELEASE ORAL at 17:28

## 2021-01-02 RX ADMIN — Medication 200 MILLIGRAM(S): at 12:48

## 2021-01-02 RX ADMIN — Medication 100 MILLIGRAM(S): at 21:08

## 2021-01-02 RX ADMIN — Medication 100 MILLIGRAM(S): at 05:11

## 2021-01-02 RX ADMIN — PANTOPRAZOLE SODIUM 40 MILLIGRAM(S): 20 TABLET, DELAYED RELEASE ORAL at 05:11

## 2021-01-02 RX ADMIN — Medication 100 MILLIGRAM(S): at 14:11

## 2021-01-02 NOTE — PROGRESS NOTE ADULT - SUBJECTIVE AND OBJECTIVE BOX
seen and examined at bedside     dark stools this am     ros all others are neg     no abdo pain   no nv   no fc     ICU Vital Signs Last 24 Hrs  T(C): 37.1 (01 Jan 2021 14:31), Max: 37.3 (31 Dec 2020 21:45)  T(F): 98.7 (01 Jan 2021 14:31), Max: 99.2 (31 Dec 2020 21:45)  HR: 64 (01 Jan 2021 14:31) (64 - 78)  BP: 126/70 (01 Jan 2021 14:31) (104/66 - 145/80)  BP(mean): 94 (31 Dec 2020 19:30) (82 - 94)  ABP: --  ABP(mean): --  RR: 18 (01 Jan 2021 14:31) (18 - 18)  SpO2: 98% (01 Jan 2021 14:31) (98% - 99%)    Home Medications:    MEDICATIONS  (STANDING):  ciprofloxacin   IVPB 400 milliGRAM(s) IV Intermittent every 12 hours  hydrocortisone hemorrhoidal Suppository 1 Suppository(s) Rectal two times a day  metroNIDAZOLE  IVPB 500 milliGRAM(s) IV Intermittent every 8 hours  pantoprazole  Injectable 40 milliGRAM(s) IV Push two times a day  sodium chloride 0.9%. 1000 milliLiter(s) (70 mL/Hr) IV Continuous <Continuous>    MEDICATIONS  (PRN):                          9.1    6.72  )-----------( 178      ( 01 Jan 2021 05:37 )             26.6    01-01    141  |  110<H>  |  12  ----------------------------<  103<H>  4.2   |  26  |  0.93    Ca    7.8<L>      01 Jan 2021 05:37  Phos  2.9     12-31  Mg     1.9     12-31    TPro  5.5<L>  /  Alb  2.6<L>  /  TBili  0.3  /  DBili  x   /  AST  10  /  ALT  18  /  AlkPhos  35<L>  01-01           
seen and examined at bedside     no abdo pain   blood in stool improving over time less amout     genesis clear diet     ros all others are neg    ICU Vital Signs Last 24 Hrs  T(C): 37 (02 Jan 2021 14:50), Max: 37.4 (01 Jan 2021 21:35)  T(F): 98.6 (02 Jan 2021 14:50), Max: 99.3 (01 Jan 2021 21:35)  HR: 65 (02 Jan 2021 14:50) (65 - 72)  BP: 123/65 (02 Jan 2021 14:50) (108/67 - 123/65)  BP(mean): --  ABP: --  ABP(mean): --  RR: 18 (02 Jan 2021 14:50) (18 - 18)  SpO2: 97% (02 Jan 2021 14:50) (97% - 98%)                          8.7    6.46  )-----------( 193      ( 02 Jan 2021 06:21 )             26.1   01-02    142  |  109<H>  |  8   ----------------------------<  100<H>  3.9   |  26  |  0.94    Ca    8.2<L>      02 Jan 2021 06:21  Phos  2.9     12-31  Mg     2.0     01-02    TPro  5.5<L>  /  Alb  2.6<L>  /  TBili  0.3  /  DBili  x   /  AST  10  /  ALT  18  /  AlkPhos  35<L>  01-01      Home Medications:    1MEDICATIONS  (STANDING):  ciprofloxacin   IVPB 400 milliGRAM(s) IV Intermittent every 12 hours  hydrocortisone hemorrhoidal Suppository 1 Suppository(s) Rectal two times a day  metroNIDAZOLE  IVPB 500 milliGRAM(s) IV Intermittent every 8 hours  pantoprazole  Injectable 40 milliGRAM(s) IV Push two times a day  sodium chloride 0.9%. 1000 milliLiter(s) (70 mL/Hr) IV Continuous <Continuous>    MEDICATIONS  (PRN):

## 2021-01-02 NOTE — PROGRESS NOTE ADULT - ASSESSMENT
65 M recurrent hx of sigmoid diverticulitis 2016 no hx of colon cancer.   dark stools present but improving in volume over time BP ok  monitor clinically   check hh trend over time, transfuse for hb less than 7.5    continue empiric iv cipro/flagyl   GI team see assist appreciated, diet per GI team   vte proph      reviewed with patient IDT plan of care  EST dc 1-2 D

## 2021-01-03 ENCOUNTER — TRANSCRIPTION ENCOUNTER (OUTPATIENT)
Age: 66
End: 2021-01-03

## 2021-01-03 VITALS
DIASTOLIC BLOOD PRESSURE: 88 MMHG | TEMPERATURE: 99 F | RESPIRATION RATE: 18 BRPM | SYSTOLIC BLOOD PRESSURE: 130 MMHG | OXYGEN SATURATION: 96 % | HEART RATE: 80 BPM

## 2021-01-03 LAB
ANION GAP SERPL CALC-SCNC: 6 MMOL/L — SIGNIFICANT CHANGE UP (ref 5–17)
BUN SERPL-MCNC: 8 MG/DL — SIGNIFICANT CHANGE UP (ref 7–18)
CALCIUM SERPL-MCNC: 8.5 MG/DL — SIGNIFICANT CHANGE UP (ref 8.4–10.5)
CHLORIDE SERPL-SCNC: 111 MMOL/L — HIGH (ref 96–108)
CO2 SERPL-SCNC: 25 MMOL/L — SIGNIFICANT CHANGE UP (ref 22–31)
CREAT SERPL-MCNC: 1.06 MG/DL — SIGNIFICANT CHANGE UP (ref 0.5–1.3)
GLUCOSE SERPL-MCNC: 101 MG/DL — HIGH (ref 70–99)
HCT VFR BLD CALC: 27 % — LOW (ref 39–50)
HGB BLD-MCNC: 9.1 G/DL — LOW (ref 13–17)
MCHC RBC-ENTMCNC: 30.6 PG — SIGNIFICANT CHANGE UP (ref 27–34)
MCHC RBC-ENTMCNC: 33.7 GM/DL — SIGNIFICANT CHANGE UP (ref 32–36)
MCV RBC AUTO: 90.9 FL — SIGNIFICANT CHANGE UP (ref 80–100)
NRBC # BLD: 0 /100 WBCS — SIGNIFICANT CHANGE UP (ref 0–0)
PLATELET # BLD AUTO: 217 K/UL — SIGNIFICANT CHANGE UP (ref 150–400)
POTASSIUM SERPL-MCNC: 4.4 MMOL/L — SIGNIFICANT CHANGE UP (ref 3.5–5.3)
POTASSIUM SERPL-SCNC: 4.4 MMOL/L — SIGNIFICANT CHANGE UP (ref 3.5–5.3)
RBC # BLD: 2.97 M/UL — LOW (ref 4.2–5.8)
RBC # FLD: 12.8 % — SIGNIFICANT CHANGE UP (ref 10.3–14.5)
SODIUM SERPL-SCNC: 142 MMOL/L — SIGNIFICANT CHANGE UP (ref 135–145)
WBC # BLD: 7.11 K/UL — SIGNIFICANT CHANGE UP (ref 3.8–10.5)
WBC # FLD AUTO: 7.11 K/UL — SIGNIFICANT CHANGE UP (ref 3.8–10.5)

## 2021-01-03 PROCEDURE — 87177 OVA AND PARASITES SMEARS: CPT

## 2021-01-03 PROCEDURE — 85730 THROMBOPLASTIN TIME PARTIAL: CPT

## 2021-01-03 PROCEDURE — 83615 LACTATE (LD) (LDH) ENZYME: CPT

## 2021-01-03 PROCEDURE — 87635 SARS-COV-2 COVID-19 AMP PRB: CPT

## 2021-01-03 PROCEDURE — 99238 HOSP IP/OBS DSCHRG MGMT 30/<: CPT

## 2021-01-03 PROCEDURE — 83735 ASSAY OF MAGNESIUM: CPT

## 2021-01-03 PROCEDURE — 80053 COMPREHEN METABOLIC PANEL: CPT

## 2021-01-03 PROCEDURE — 84466 ASSAY OF TRANSFERRIN: CPT

## 2021-01-03 PROCEDURE — 86803 HEPATITIS C AB TEST: CPT

## 2021-01-03 PROCEDURE — 82728 ASSAY OF FERRITIN: CPT

## 2021-01-03 PROCEDURE — 87046 STOOL CULTR AEROBIC BACT EA: CPT

## 2021-01-03 PROCEDURE — 84100 ASSAY OF PHOSPHORUS: CPT

## 2021-01-03 PROCEDURE — 83036 HEMOGLOBIN GLYCOSYLATED A1C: CPT

## 2021-01-03 PROCEDURE — 85652 RBC SED RATE AUTOMATED: CPT

## 2021-01-03 PROCEDURE — 85027 COMPLETE CBC AUTOMATED: CPT

## 2021-01-03 PROCEDURE — 85610 PROTHROMBIN TIME: CPT

## 2021-01-03 PROCEDURE — 80048 BASIC METABOLIC PNL TOTAL CA: CPT

## 2021-01-03 PROCEDURE — 84443 ASSAY THYROID STIM HORMONE: CPT

## 2021-01-03 PROCEDURE — 85025 COMPLETE CBC W/AUTO DIFF WBC: CPT

## 2021-01-03 PROCEDURE — 82607 VITAMIN B-12: CPT

## 2021-01-03 PROCEDURE — 36415 COLL VENOUS BLD VENIPUNCTURE: CPT

## 2021-01-03 PROCEDURE — 80061 LIPID PANEL: CPT

## 2021-01-03 PROCEDURE — 83550 IRON BINDING TEST: CPT

## 2021-01-03 PROCEDURE — 93005 ELECTROCARDIOGRAM TRACING: CPT

## 2021-01-03 PROCEDURE — 82746 ASSAY OF FOLIC ACID SERUM: CPT

## 2021-01-03 PROCEDURE — 86900 BLOOD TYPING SEROLOGIC ABO: CPT

## 2021-01-03 PROCEDURE — 82272 OCCULT BLD FECES 1-3 TESTS: CPT

## 2021-01-03 PROCEDURE — 86769 SARS-COV-2 COVID-19 ANTIBODY: CPT

## 2021-01-03 PROCEDURE — 86901 BLOOD TYPING SEROLOGIC RH(D): CPT

## 2021-01-03 PROCEDURE — 74177 CT ABD & PELVIS W/CONTRAST: CPT

## 2021-01-03 PROCEDURE — 96374 THER/PROPH/DIAG INJ IV PUSH: CPT

## 2021-01-03 PROCEDURE — 87045 FECES CULTURE AEROBIC BACT: CPT

## 2021-01-03 PROCEDURE — 85045 AUTOMATED RETICULOCYTE COUNT: CPT

## 2021-01-03 PROCEDURE — 86850 RBC ANTIBODY SCREEN: CPT

## 2021-01-03 PROCEDURE — 93306 TTE W/DOPPLER COMPLETE: CPT

## 2021-01-03 PROCEDURE — 96375 TX/PRO/DX INJ NEW DRUG ADDON: CPT

## 2021-01-03 PROCEDURE — 83540 ASSAY OF IRON: CPT

## 2021-01-03 PROCEDURE — 99285 EMERGENCY DEPT VISIT HI MDM: CPT | Mod: 25

## 2021-01-03 RX ORDER — CIPROFLOXACIN LACTATE 400MG/40ML
1 VIAL (ML) INTRAVENOUS
Qty: 14 | Refills: 0
Start: 2021-01-03 | End: 2021-01-09

## 2021-01-03 RX ORDER — METRONIDAZOLE 500 MG
1 TABLET ORAL
Qty: 21 | Refills: 0
Start: 2021-01-03 | End: 2021-01-09

## 2021-01-03 RX ORDER — METRONIDAZOLE 500 MG
100 TABLET ORAL
Qty: 0 | Refills: 0 | DISCHARGE
Start: 2021-01-03

## 2021-01-03 RX ORDER — HYDROCORTISONE 1 %
1 OINTMENT (GRAM) TOPICAL
Qty: 14 | Refills: 0
Start: 2021-01-03 | End: 2021-01-09

## 2021-01-03 RX ORDER — HYDROCORTISONE 1 %
1 OINTMENT (GRAM) TOPICAL
Qty: 0 | Refills: 0 | DISCHARGE
Start: 2021-01-03

## 2021-01-03 RX ADMIN — Medication 1 SUPPOSITORY(S): at 05:02

## 2021-01-03 RX ADMIN — Medication 100 MILLIGRAM(S): at 05:02

## 2021-01-03 RX ADMIN — PANTOPRAZOLE SODIUM 40 MILLIGRAM(S): 20 TABLET, DELAYED RELEASE ORAL at 05:02

## 2021-01-03 NOTE — DISCHARGE NOTE PROVIDER - CARE PROVIDER_API CALL
Singh Hess  CARDIOVASCULAR DISEASE  [address] [place] [id number]  Phone: [phone]  Fax: [phone]  Follow Up Time: 1 week

## 2021-01-03 NOTE — DISCHARGE NOTE PROVIDER - HOSPITAL COURSE
Pt is 65 year old male with PMHX of Hemorrhoids, diverticulosis ( blood diarrhoea 2016 that resolved on its own) no PSHX came with complaint of BRBPR. pt reports he has been having red color stool had 8-10 episodes prior to arrival to [facility]. Last episode was around 11am 12/31. Pt reported that he has  hemorrhoids but never had this much bleeding. Pt reported bright red blood when there is no stool in BM and blackish BM when there is stool. Pt endorsed  pressure like sensation in abd kel left lower side. CT scan showed Early diverticulitis involving the sigmoid colon. Small focal area of tree-in-bud opacities in the RIGHT lower lobe. Consider follow-up in 6 weeks. Pt started on Flagyl and Cipro. bloody stool has resolved and will continue to on flagyl and cipro for total of 10days. PCP and GI follow up.  Please note that this a brief summary of hospital course please refer to daily progress notes and consult notes for full course and events

## 2021-01-03 NOTE — DISCHARGE NOTE NURSING/CASE MANAGEMENT/SOCIAL WORK - PATIENT PORTAL LINK FT
You can access the FollowMyHealth Patient Portal offered by [facility] by registering at the following website: [email or web address]. By joining [employer]’s FollowMyHealth portal, you will also be able to view your health information using other applications (apps) compatible with our system.

## 2021-01-03 NOTE — DISCHARGE NOTE PROVIDER - NSDCMRMEDTOKEN_GEN_ALL_CORE_FT
ciprofloxacin 500 mg oral tablet: 1 tab(s) orally 2 times a day   Flagyl 500 mg oral tablet: 1 tab(s) orally every 8 hours   hydrocortisone 25 mg rectal suppository: 1 suppository(ies) rectal 2 times a day

## 2021-01-03 NOTE — DISCHARGE NOTE PROVIDER - NSDCCPCAREPLAN_GEN_ALL_CORE_FT
PRINCIPAL DISCHARGE DIAGNOSIS  Diagnosis: Diverticulitis  Assessment and Plan of Treatment: You presented with dark stool likely due to diverticulitis. Ct scan showed that you have Diverticulitis. You were given IV antibiotics which you will continue until 1/9/2021 via mouth. YOur blood count has beed stable here.  Please follow up with your PCP and GI dotor in 1-2 weeks. You may need colonoscopy.  Complete antibiotics as prescripbed.  Seek medical atttention if you notice bloody stool again. avoid NSAIDS such as motrin, ibuprofen, and advil.      SECONDARY DISCHARGE DIAGNOSES  Diagnosis: Hemorrhoids  Assessment and Plan of Treatment: Continue to take suppository steriods as prescribed if needed.
